# Patient Record
Sex: MALE | NOT HISPANIC OR LATINO | Employment: OTHER | URBAN - METROPOLITAN AREA
[De-identification: names, ages, dates, MRNs, and addresses within clinical notes are randomized per-mention and may not be internally consistent; named-entity substitution may affect disease eponyms.]

---

## 2020-08-21 ENCOUNTER — TRANSFERRED RECORDS (OUTPATIENT)
Dept: HEALTH INFORMATION MANAGEMENT | Facility: CLINIC | Age: 65
End: 2020-08-21

## 2021-04-16 ENCOUNTER — MEDICAL CORRESPONDENCE (OUTPATIENT)
Dept: HEALTH INFORMATION MANAGEMENT | Facility: CLINIC | Age: 66
End: 2021-04-16

## 2021-04-16 ENCOUNTER — TELEPHONE (OUTPATIENT)
Dept: OPHTHALMOLOGY | Facility: CLINIC | Age: 66
End: 2021-04-16

## 2021-04-16 NOTE — TELEPHONE ENCOUNTER
4/16/20212 4:35PM Recording states that the call cannot be completed as dialed ([phone]).    4/16/2021 4:35PM Recording states that the number ([phone]) is not a working number.

## 2021-04-27 NOTE — TELEPHONE ENCOUNTER
4/26/2021 9:51AM Dominic FOY requesting a call back to schedule his testing. He states he lives at [address] [place] [address] in [facility].    4/19/2021 10:12AM Dominic FOY stating that he has been waiting for someone to call him to schedule this testing. He received a call from Dr. Phelps's office that they received a letter advising that we had been unable to reach him. He states his phone number is [phone].

## 2021-04-27 NOTE — TELEPHONE ENCOUNTER
4/27/2021 9:53AM Dominic FOY requesting a call back to schedule testing. He states he has left several messages, but has not received a return call.

## 2021-05-04 DIAGNOSIS — H35.3121 EARLY STAGE DRY AGE-RELATED MACULAR DEGENERATION OF LEFT EYE: ICD-10-CM

## 2021-05-04 DIAGNOSIS — H35.89 OTHER SPECIFIED RETINAL DISORDERS: ICD-10-CM

## 2021-05-04 DIAGNOSIS — H35.372 LEFT EPIRETINAL MEMBRANE: Primary | ICD-10-CM

## 2021-05-11 ENCOUNTER — APPOINTMENT (OUTPATIENT)
Dept: OPHTHALMOLOGY | Facility: CLINIC | Age: 66
End: 2021-05-11
Attending: OPHTHALMOLOGY
Payer: COMMERCIAL

## 2021-05-11 DIAGNOSIS — H35.372 LEFT EPIRETINAL MEMBRANE: ICD-10-CM

## 2021-05-11 DIAGNOSIS — H35.3121 EARLY STAGE DRY AGE-RELATED MACULAR DEGENERATION OF LEFT EYE: ICD-10-CM

## 2021-05-11 DIAGNOSIS — H35.89 OTHER SPECIFIED RETINAL DISORDERS: ICD-10-CM

## 2021-05-11 PROCEDURE — 999N000103 HC STATISTIC NO CHARGE FACILITY FEE

## 2021-05-11 PROCEDURE — 92083 EXTENDED VISUAL FIELD XM: CPT

## 2021-05-11 PROCEDURE — 92283 EXTND COLOR VISION XM: CPT

## 2021-05-11 PROCEDURE — 92273 FULL FIELD ERG W/I&R: CPT

## 2021-05-11 PROCEDURE — 92270 ELECTRO-OCULOGRAPHY W/I&R: CPT

## 2021-05-11 ASSESSMENT — VISUAL ACUITY
OS_CC: 20/20
METHOD: SNELLEN - LINEAR
CORRECTION_TYPE: GLASSES
OD_CC: 20/30

## 2021-05-11 ASSESSMENT — REFRACTION_WEARINGRX
SPECS_TYPE: BIFOCAL
OS_AXIS: 163
OD_ADD: +2.50
OD_CYLINDER: +0.25
OD_SPHERE: -0.50
OS_ADD: +2.50
OS_SPHERE: -0.25
OS_CYLINDER: +0.25
OD_AXIS: 165

## 2021-05-11 NOTE — NURSING NOTE
"Referred by Dr. Josef Phelps/[facility] for Ishihara+GVF+ffERG+EOG.  Last eye exam notes with Dr. Phelps 08-21-20:  Type II diabetes, insulin dependent dxd 2004.  Mild NPDR left eye.  PVD right eye.  No retinal tears or retinal detachment seen on clinical exam.  ERM left eye--stable and not visually significant.  Dry ARMD, early dry stage left eye--mild and has minimal impact on vision at this time.  No clinical evidence of choroidal neovascularization seen on today s exam, diagnostic studies, and/or review of records. Nuclear sclerosis both eyes.  No large floaters seen on exam and cataracts are mild.  Systemic HTN.  OTHER SUBJECTIVE VISUAL DISTURBANCES BOTH EYES.  UNCLEAR ETIOLOGY.   Pt states that while looking at blue willem, it is gray to him.  Also c/o seeing brink.   DFE notes right eye list  Punched-out RPE lesions (temporal).  No holes or tears.  Attached 360 degrees.   DFE notes left eye list  Pigmentary changes (central=macula).  One dot hemorrhage.  No holes or tears.  Attached 360 degrees.   Pt states no interval changes but would like to find out what is causing his vision probs.  DM Type 2 x15 yrs.  Gls rx x20yrs mostly for rdg purposes.  Noted \"zigzaggy lines\" both eyes all the time x8-9yrs.  Probs worsening x1yr, like new floaters (right eye w/primarily larger dark floater and left eye w/smaller dark floaters and some clear floaters).  C/O \"vision is scrambled or is distorted especially while watching tv\".  No color vision probs except for noted as above when looking at blue willem.  Will schedule f/u with Dr. Phelps on his own.     "

## 2021-05-11 NOTE — LETTER
"2021    STANDARD ERG REPORT    Referring:  Josef Phelps MD /[facility]     RE: Dominic Sanchez  MRN: [id number]  : 1955       ERG Date:  2021    CLINICAL HISTORY: Dominic Sanchez is a 65-year-old patient referred by Dr. Josef Phelps/[facility] for Ishihara+GVF+ffERG+EOG.    Pt states that while looking at blue willem, it is gray to him. Noted \"zigzaggy lines\" both eyes all the time x8-9yrs.  Probs worsening x1yr, like new floaters. Complaining of \"vision is scrambled or is distorted especially while watching tv\".  Last eye exam notes with Dr. Phelps 20:  Type II diabetes, insulin dependent dxd .  Mild NPDR left eye.  PVD right eye.  No retinal tears or retinal detachment seen on clinical exam.  Epiretinal membrane  left eye--stable and not visually significant.  Dry ARMD, early dry stage left eye--mild and has minimal impact on vision at this time.  No clinical evidence of choroidal neovascularization.    IMPRESSION:   Nonspecific electroretinogram changes of unknown clinical significance      No significant emily cone dysfunction    Visual Acuity with glasses:  Right Eye: 20/30, PHNI         -0.50 +0.25 x 165         Left Eye: 20/20         -0.25 +0.25 x 163                                                   ALL AVERAGED                   Data for Full-Field ERG  Right Eye  Left Eye   DARK-ADAPTED Patient Normal Patient   0.01 ERG (emily) b-wave amplitude ( v) 350 69.6 to 348.2 169   0.01 ERG (emily) b-wave implicit time (ms) 99.8 78.2 to 117.2 98.2         3.0 ERG (combined) a-wave amplitude ( v) -270 -259.4 to -98 -144   3.0 ERG (combined) a-wave implicit time (ms) 16.6 11.5 to 20.3 16.6   3.0 ERG (combined) b-wave amplitude ( v) 427 159.2 to 421.6 233   3.0 ERG (combined) b-wave implicit time (ms) 59.4 41.2 to 57.2 57.1         10.0 ERG (brighter combined) a-wave amplitude ( v) -278 -291 to -110 -164   10.0 ERG (brighter combined) a-wave implicit time (ms) 13.3 9.9 to 15.1 14.4 "   10.0 ERG (brighter combined) b-wave amplitude ( v) 367 191.5 to 403.1 245   10.0 ERG (brighter combined) b-wave implicit time (ms) 62.7 39.1 to 55.3 59.4         3.0 Oscillatory Potentials  Present     LIGHT-ADAPTED       3.0 Flicker (30Hz) amplitude ( v) 119(129) 56.4 to 151.2 83(75)   3.0 Flicker (30Hz) implicit time (ms) 28.8(61.6) 21.8 to 31.6 30.5(62.1)         3.0 ERG (cone) a-wave amplitude ( v) -47 -45.1 to -13.7 -18   3.0 ERG (cone) a-wave implicit time (ms) 14.4 11.4 to 16.8 15.5   3.0 ERG (cone) b-wave amplitude ( v) 205 62.4 to 174.2 90   3.0 ERG (cone) b-wave implicit time (ms) 32.2 26.8 to 34.2 32.2   * = manipulated cursors  parentheses = cursors at selected peaks  ---- = residual to non-measurable  xxxx = not tested          TECH NOTES: The pupils were equally well-dilated ~10mm. +Redundant skin and taped to make adequate contact for reference electrodes. Tolerated dtl nicely. Equal and adequate eye opening with minimal effort to clear dilated pupils (despite dermatochalasis). Impedances low and comparable throughout. Slight increase noise on baseline relieved w/instruction to relax facial muscles. Some difficulty with blink to stimulus. Otherwise, no eyelid flutter to bright flashes. +Significant eye closure to about only 1/5 eye opening for flicker. NOTE: Good attention and cooperation, though, and no probs with alignment for ColorDome. Monitored and corrected for slight persistent tendency for left head turn. NOTE: Slight left exo noted in ColorDome and pt stated double vision throughout testing. Able to alternate fixation with no appreciable difference in recordings.    RETeval ISCEV photopic flash/flicker and PhNR cd (dilated) discussed and performed due to significant eye closure to E3 flicker. Pt fixated well with fellow eye occluded. No major probs w/eye movement or blinking.    INTERPRETATION:  The electroretinogram was performed according to ISCEV standards using the ESPION E3 system and  DTL fiber-recording electrodes. The patient tolerated the testing well. The waveforms are fairly reproducible and well formed. The responses in between both eyes were asymmetric with right eye having greater amplitudes compared to left eye. The normative values provided above represent the 95% confidence limits for a normal individual the age of the patient. The patient s responses are averaged.    In scotopic conditions, the emily-specific responses were normal in both eyes. There was supra-normal response for right eye. The maximal response, a combined emily and cone response, was essentially normal left eye; supra normal right eye, and the b-wave implicit time was delayed for right eye; normal for left eye. With higher intensity, the responses were normal but the implicit time was delayed for the b-wave both eyes.    In light-adapted conditions, the 30-Hz flicker response has a normal amplitude and normal implicit time in both eyes. The single photopic flash responses are normal, except for increased amplitudes for right eye.    A cone RETeval was performed for comparison and was normal.     CONCLUSION:  There are mild, nonspecific electroretinogram changes of unknown clinical significance. There is no significant loss of emily-cone photoreceptor function in either eye. Clinical correlation is recommended.    I would recommend a repeat electroretinogram in a couple of years if there continues to be concern regarding a possible retinal dystrophy.           STANDARD EOG REPORT              EOG Date:  5/11/2021    IMPRESSION:  Normal electrooculogram (EOG)     TECH NOTES: Slight noise on baselines resolved w/instructions to relax. NOTE: +Double vision as with ffERG but no difficulty following red fixation. Recording for right eye not as regular/consistent vs left eye. Difficult to say if both eyes not moving equally but, in general, did not seem that eyes were able to move as maximally to either side temporally/nasally.  Good attention and cooperation, though, and no probs w/alignment for ColorDome. Monitored and corrected for slight persistent tendency for left head turn. Eye pain=no.       DATA FOR EOG Right Eye Left Eye NORMAL   [place] ratio 2.57 3.45 >1.8     INTERPRETATION:   Normal electrooculogram (EOG)    The [place] ratio, the ratio of the light peak to dark trough potentials, is used to determine the normalcy of the results. An [place]r ratio of 1.80 or greater is normal, 1.65 to 1.80 is subnormal, and < 1.65 is significantly subnormal.              STANDARD RETeval ERG REPORT,  ISCEV Photopic Flash/Flicker and PhNR cd  -- RETeval w/Sensor Strip = 1/3 Espion3 w/DTL    ERG Date:  5/11/2021                                                            ALL AVERAGED  Data for Full-Field ERG Right Eye   Left Eye    Dark-Adapted Patient Normal  Patient   0.01 ERG (emily) amplitude( v) xxxx 21 to 79 xxxx   0.01 ERG (emily) implicit time(ms) xxxx 68 to 103 xxxx   MMMMM      3.0 ERG (combined) a-wave amplitude( v) xxxx -62 to -22 xxxx   3.0 ERG (combined) a-wave implicit time(ms) xxxx 13 to 18 xxxx   3.0 ERG (combined) b-wave amplitude( v) xxxx 42 to 86 xxxx   3.0 ERG (combined) b-wave implicit time(ms) xxxx 35 to 52 xxxx   MMMMM      10.0 ERG (brighter) a-wave amplitude( v) xxxx -77 to -31 xxxx   10.0 ERG (brighter) a-wave implicit time(ms) xxxx 10 to 13 xxxx   10.0 ERG (brighter) b-wave amplitude( v) xxxx 54 to 95 xxxx   10.0 ERG (brighter) b-wave implicit time(ms) xxxx 35 to 53 xxxx         3.0 Oscillatory Potentials xxxx Present  xxxx    Light-Adapted       3.0 Flicker (30-Hz) amplitude( v) 33.1 15.6 to 52.7 31.0   3.0 Flicker (30-Hz) implicit time(ms) 28.2 24.7 to 29.6 28.9         3.0 ERG (cone) a-wave amplitude( v) -8.0 -16.0 to -2.0 -4.5   3.0 ERG (cone) a-wave implicit time(ms) 12.6 10.5 to 14.7 14.3   3.0 ERG (cone) b-wave amplitude( v) 33.1 16.1 to 63.7 33.7   3.0 ERG (cone) b-wave implicit time(ms) 31.1 27.5 to 32.7 32.3             ---- = residual to non-measurable          xxxx = not tested            Normative values per  Evaluation of light- and dark-adapted ERGs using a mydriasis-free,                               portable system: clinical classifications and normative data  by H Claude, X Issac, S Vito, LEEANN Soriano, HAFSA Mcfadden, HAFSA Perez, MARCUS Munoz,   Ophthalmology and Vision Sciences,                           [facility], [facility]. [email or web address]                     Normative values per [facility] data per individual age at time of testing, cd (dilated) and Td (undilated).              STANDARD GVF REPORT              GVF Date:  5/11/2021    IMPRESSION:  Mild peripheral constriction both eyes and paracentral areas of decreased sensitivity right eye, otherwise normal visual field. No scotomas.    INTERPRETATION:    Right Eye:   Fixation: Good  Blind spot: Normal size to I2e  Findings: The peripheral isopters extended horizontally 120 degrees and vertically 110 degrees.  There are areas of decreased sensitivity paracentrally, but no central defect and no scotomas were identified.    Left Eye:  Fixation: Good  Blind spot: Normal size to I2e  Findings:  The peripheral isopters extended horizontally 135 degrees and vertically 115 degrees.  There are areas of decreased sensitivity paracentrally, but no central defect and no scotomas were identified.        Dear Buzz, thank you for the opportunity to provide electrophysiologic services for this patient.  Please do not hesitate to call if there should be any questions regarding these results.    Sincerely,    Karin Pride MD     Retina Service   Department of Ophthalmology and Visual Neurosciences   [facility]  Phone: [phone]   Fax: [phone]

## 2021-05-12 NOTE — PROGRESS NOTES
"2021    STANDARD GVF REPORT    Referring: Dr. Josef Phelps/[facility]     RE: Dominic Sanchez  MRN: [id number]  : 1955                 GVF Date:  2021    CLINICAL HISTORY: Dominic Sanchez is a 65 year old year-old patient Referred by Dr. Josef Phelps/[facility] for Ishihara+GVF+ffERG+EOG.    Pt states that while looking at blue willem, it is gray to him. Noted \"zigzaggy lines\" both eyes all the time x8-9yrs.  Probs worsening x1yr, like new floaters. Complaining of \"vision is scrambled or is distorted especially while watching tv\".  Last eye exam notes with Dr. Phelps 20:  Type II diabetes, insulin dependent dxd .  Mild NPDR left eye.  PVD right eye.  No retinal tears or retinal detachment seen on clinical exam.  Epiretinal membrane  left eye--stable and not visually significant.  Dry ARMD, early dry stage left eye--mild and has minimal impact on vision at this time.  No clinical evidence of choroidal neovascularization.    IMPRESSION:  Mild peripheral constriction both eyes and paracentral areas of decreased sensitivity right eye, Otherwise normal visual field      No scotomas    Visual Acuity Right Eye : 20/30, PHNI      w/gls, -0.50 + 0.25x165    Visual Acuity Left Eye : 20/20      w/gls, -0.25 + 0.25x163      INTERPRETATION:       Right eye:   Fixation: good  Blind spot: normal size to I2e  Findings: The peripheral isopters extended horizontally 120 degrees and vertically 110 degrees.  There are areas of decreased sensitivity paracentrally, but no  central defect, and no scotomas were identified.  Left eye:  Fixation: good  Blind spot: normal size to I2e  Findings:  The peripheral isopters extended horizontally 135 degrees and vertically 115 degrees.  There are areas of decreased sensitivity paracentrally, but no  central defect, and no scotomas were identified.    Sincerely,    Karin Pride MD  .  Retina Service   Department of Ophthalmology and " Visual Neurosciences   [facility]  Phone: [phone]   Fax: [phone]

## 2021-05-12 NOTE — PROGRESS NOTES
2021    STANDARD EOG REPORT    Referring: Dr. Josef Phelps/[facility]     RE: Dominic Sanchez  MRN: [id number]  : 1955                 EOG Date:  2021    IMPRESSION: normal electrooculogram (EOG)     Tech notes: Slight noise on baselines resolved w/instructions to relax. NOTE: +Double vision as with ffERG but no difficulty following red fixation. Recording ror right eye not as regular/consistent vs left eye. Difficult to say if both eyes not moving equally but, in general, did not seem that eyes were able to move as maximally to either side temporally/nasally. Good attention and cooperation, though, and no probs w/alignment for ColorDome. Monitored and corrected for slight persistent tendency for left head turn. Eye pain=no..     Visual Acuity Right Eye : 20/30, PHNI      w/gls, -0.50 + 0.25x165    Visual Acuity Left Eye : 20/20      w/gls, -0.25 + 0.25x163        DATA FOR EOG Right Eye Left Eye NORMAL   [place] ratio 2.57 3.45 >1.8     INTERPRETATION:   1. Normal electrooculogram (EOG)    The [place] ratio, the ratio of the Light peak to dark trough potentials is used to determine the normalcy of the results. An [place] ratio of 1.80 or greater is normal, 1.65 to 1.80 is subnormal, and < 1.65 is significantly subnormal.    Sincerely,    Karin Pride MD  .    Electrophysiology Service   Department of Ophthalmology & Visual Neurosciences   [facility]  Phone: [phone]   Fax: [phone]

## 2021-05-12 NOTE — PROGRESS NOTES
"  STANDARD ERG REPORT    Referring:  Dr. Josef Phelps/"[facility]     RE:  Dominic Sanchez  MRN:  [id number]  :  1955    ERG Date:  2021    CLINICAL HISTORY: Dominic Sanchez is a 65 year old year-old patient Referred by Dr. Josef Phelps/"[facility] for Ishihara+GVF+ffERG+EOG.    Pt states that while looking at blue willem, it is gray to him. Noted \"zigzaggy lines\" both eyes all the time x8-9yrs.  Probs worsening x1yr, like new floaters. Complaining of \"vision is scrambled or is distorted especially while watching tv\".  Last eye exam notes with Dr. Phelps 20:  Type II diabetes, insulin dependent dxd .  Mild NPDR left eye.  PVD right eye.  No retinal tears or retinal detachment seen on clinical exam.  Epiretinal membrane  left eye--stable and not visually significant.  Dry ARMD, early dry stage left eye--mild and has minimal impact on vision at this time.  No clinical evidence of choroidal neovascularization.    IMPRESSION:   Non-specific electroretinogram changes of unknown clinical significance.      No significant emily cone dysfunction                 Visual Acuity Right Eye : 20/30, PHNI      w/gls, -0.50 + 0.25x165    Visual Acuity Left Eye : 20/20      w/gls, -0.25 + 0.25x163                                               ALL AVERAGED                   Data for Full-Field ERG  Right Eye  Left Eye   DARK-ADAPTED Patient Normal Patient   0.01 ERG (emily) b-wave amplitude ( v) 350 69.6 to 348.2 169   0.01 ERG (emily) b-wave implicit time (ms) 99.8 78.2 to 117.2 98.2         3.0 ERG (combined) a-wave amplitude ( v) -270 -259.4 to -98 -144   3.0 ERG (combined) a-wave implicit time (ms) 16.6 11.5 to 20.3 16.6   3.0 ERG (combined) b-wave amplitude ( v) 427 159.2 to 421.6 233   3.0 ERG (combined) b-wave implicit time (ms) 59.4 41.2 to 57.2 57.1         10.0 ERG (brighter combined) a-wave amplitude ( v) -278 -291 to -110 -164   10.0 ERG (brighter combined) a-wave implicit time (ms) 13.3 9.9 to " 15.1 14.4   10.0 ERG (brighter combined) b-wave amplitude ( v) 367 191.5 to 403.1 245   10.0 ERG (brighter combined) b-wave implicit time (ms) 62.7 39.1 to 55.3 59.4         3.0 Oscillatory Potentials  present    LIGHT-ADAPTED       3.0 Flicker (30Hz) amplitude ( v) 119(129) 56.4 to 151.2 83(75)   3.0 Flicker (30Hz) implicit time (ms) 28.8(61.6) 21.8 to 31.6 30.5(62.1)         3.0 ERG (cone) a-wave amplitude ( v) -47 -45.1 to -13.7 -18   3.0 ERG (cone) a-wave implicit time (ms) 14.4 11.4 to 16.8 15.5   3.0 ERG (cone) b-wave amplitude ( v) 205 62.4 to 174.2 90   3.0 ERG (cone) b-wave implicit time (ms) 32.2 26.8 to 34.2 32.2   * = manipulated cursors  parentheses = cursors at selected peaks  ---- = residual to non-measurable  xxxx = not tested          Tech notes: the pupils were Equally well-dilated ~10mm. +Redundant skin and taped to make adequate contact for reference electrodes. Tolerated dtl nicely. Equal and adequate eye opening with minimal effort to clear dilated pupils (despite dermatochalasis). Impedances low and comparable throughout. Slight increase noise on baseline relieved w/instruction to relax facial muscles. Some difficulty with blink to stimulus. Otherwise no eyelid flutter to bright flashes. +Significant eye closure to about only 1/5 eye opening for flicker. NOTE: Good attention and cooperation, though, and no probs with alignment for ColorDome. Monitored and corrected for slight persistent tendency for left head turn. NOTE: Slight left exo noted in ColorDome and pt stated double vision throughout testing. Able to alternate fixation with no appreciable difference in recordings.    RETeval ISCEV Photopic flash/flicker and PhNR cd (dilated) discussed and performed due to significant eye closure to E3 flicker. Pt fixated well with fellow eye occluded. No major probs w/eye movement or blinking.    INTERPRETATION:      The electroretinogram was performed according to ISCEV standards using ESPION E3  system and DTL fiber recording electrodes. The patient tolerated the testing well. The waveforms are fairly reproducible and well formed.     The responses in between both eyes were asymmetric with right eye having greater amplitudes compared to left eye.  The normative values provided above represent the 95% confidence limits for a normal individual the age of the patient. The patient s responses are averaged.  In scotopic conditions, the emily specific responses were normal in both eyes. There was supra-normal response for right eye.  The maximal response, a combined emily and cone responses were essentially normal left eye; supra normal right eye and the b-wave implicit time was delayed for right eye; normal for left eye. With higher intensity, the responses were normal but The implicit time was delayed for the b-wave both eyes.  In light adapted conditions, the 30-Hz flicker response has a normal amplitude and normal implicit time in both eyes. The single photopic flash response are normal, except for increased amplitudes for right eye.    A cone RETeval was performed for comparison and was normal     Conclusion:   There are mild, non-specific electroretinogram changes of unknown clinical significance.    There is no significant loss of emily-cone photoreceptor function in either eye.  Clinical correlation is recommended.    I would recommend a repeated electroretinogram in a couple of years if there continues to be concern regarding a possible retinal dystrophy.     Dear Buzz, thank you for the opportunity to provide electrophysiologic services for this patient.  Please do not hesitate to call if there should be any questions regarding these results.    Karin Pride MD     Retina Service   Department of Ophthalmology and Visual Neurosciences   [facility]  Phone: [phone]   Fax: [phone]          5/11/2021     STANDARD RETeval ERG REPORT,  [facility] Photopic Flash/Flicker  and PhNR cd  -- RETeval w/Sensor Strip = 1/3 Espion3 w/DTL      Referring:     RE:  Dominic Sanchez  MRN:  [id number]  :  1955    ERG Date:  2021                       Visual Acuity Right Eye : 20/30, PHNI      w/gls, -0.50 + 0.25x165    Visual Acuity Left Eye : 20/20      w/gls, -0.25 + 0.25x163                                                                ALL AVERAGED  Data for Full-Field ERG Right Eye   Left Eye    Dark-Adapted Patient Normal  Patient   0.01 ERG (emily) amplitude( v) xxxx 21 to 79 xxxx   0.01 ERG (emily) implicit time(ms) xxxx 68 to 103 xxxx   MMMMM      3.0 ERG (combined) a-wave amplitude( v) xxxx -62 to -22 xxxx   3.0 ERG (combined) a-wave implicit time(ms) xxxx 13 to 18 xxxx   3.0 ERG (combined) b-wave amplitude( v) xxxx 42 to 86 xxxx   3.0 ERG (combined) b-wave implicit time(ms) xxxx 35 to 52 xxxx   MMMMM      10.0 ERG (brighter) a-wave amplitude( v) xxxx -77 to -31 xxxx   10.0 ERG (brighter) a-wave implicit time(ms) xxxx 10 to 13 xxxx   10.0 ERG (brighter) b-wave amplitude( v) xxxx 54 to 95 xxxx   10.0 ERG (brighter) b-wave implicit time(ms) xxxx 35 to 53 xxxx         3.0 Oscillatory Potentials xxxx present xxxx    Light-Adapted       3.0 Flicker (30-Hz) amplitude( v) 33.1 15.6 to 52.7 31.0   3.0 Flicker (30-Hz) implicit time(ms) 28.2 24.7 to 29.6 28.9         3.0 ERG (cone) a-wave amplitude( v) -8.0 -16.0 to -2.0 -4.5   3.0 ERG (cone) a-wave implicit time(ms) 12.6 10.5 to 14.7 14.3   3.0 ERG (cone) b-wave amplitude( v) 33.1 16.1 to 63.7 33.7   3.0 ERG (cone) b-wave implicit time(ms) 31.1 27.5 to 32.7 32.3             ---- = residual to non-measurable           xxxx = not tested             Normative values per  Evaluation of light- and dark-adapted ERGs using a mydriasis-free,                                portable system: clinical classifications and normative data  by H Claude, X Issac, S Vito, SN Phan                            M Velma, HAFSA Mcfadden, SHALINI Durand ,HAFSA Garcia, C  Tammy,   Ophthalmology and Vision Sciences,                            [facility], [facility]. [email or web address]                             Normative values per [facility] data per individual age at time of testing, cd (dilated) and Td (undilated).